# Patient Record
Sex: FEMALE | Race: NATIVE HAWAIIAN OR OTHER PACIFIC ISLANDER
[De-identification: names, ages, dates, MRNs, and addresses within clinical notes are randomized per-mention and may not be internally consistent; named-entity substitution may affect disease eponyms.]

---

## 2024-07-02 NOTE — THERAPY EVALUATION
[facility], [place] - [facility]  [address] [place] [address]  Phone: [phone]   [phone]  Plan of Care / Statement of Necessity for Physical Therapy Services     Patient Name: Bella Kenny : 1980   Medical   Diagnosis: Pain in right shoulder [M25.511]  Treatment Diagnosis:   Pain in right shoulder [M25.511]     Onset Date: 2024     Referral Source: Talib Story MD Start of Care (SOC): 7/3/2024   Prior Hospitalization: See medical history Provider #: [id number]   Prior Level of Function: No limitations prior to onset. works in real estate and legislator; lots of driving. Caregiver for 3 children.   Comorbidities: Social determinants of health: unremarkable, ADHD, anxiety, Lumbago, h/o CTS     Assessment / key information:  Pt is a 44 y.o. year old RHD female with subjective complaints of R shoulder pain.  LEONILA:  Pt reports insidious onset of R shoulder pain since Feb. Which she feels may be related to prolonged driving activity. Since that time the pain has become constant, \"dull\" pain that irritates especially with lifting/sleeping. Denies paresthesias. Pt has been seen by PCP and referred for xray which has not yet been completed as well as PT.    Current pain is rated as 1 to 7/10; and described as constant, \"dull\". Localized to R shoulder and radiates to elbow/wrist occasionally  Current functional limitations:  sleeping (unable to tolerate R s/l), reaching, lifting/carrying  Better with: R UE in ER and 90 deg flex by side.   QuickDASH score= 63.75% indicating severe impairment to functional activities.    Today's evaulation is significant for:  Fall Risk Assessment: Does the patient have a fear of falling? NO.   If yes, what fall risk assessment was performed?  N/A    POSTURE/OBSERVATION: FHP, flattened c/s lordosis, R>L scap abd and ant tip   Significant slumped seated posture throughout exam    ROM   CERVICAL: Flex WNL, Ext WNL.

## 2024-07-03 ENCOUNTER — HOSPITAL ENCOUNTER (OUTPATIENT)
Facility: HOSPITAL | Age: 44
Setting detail: RECURRING SERIES
Discharge: HOME | End: 2024-07-06
Payer: COMMERCIAL

## 2024-07-03 PROCEDURE — 97162 PT EVAL MOD COMPLEX 30 MIN: CPT

## 2024-07-03 PROCEDURE — 97110 THERAPEUTIC EXERCISES: CPT

## 2024-07-03 NOTE — PROGRESS NOTES
PHYSICAL / OCCUPATIONAL THERAPY - DAILY TREATMENT NOTE (updated )  For Eval visit    Patient Name: Bella Kenny    Date: 7/3/2024    : 1980  Insurance: Payor: DONNA / Plan: [facility] [place] / Product Type: *No Product type* /      Patient  verified yes     Visit #   Current / Total 1 16   Time   In / Out 8:20 9:05   Pain   In / Out 4 3   Subjective Functional Status/Changes: See POC     TREATMENT AREA =  see POC    OBJECTIVE       30 min   Eval - untimed                      Therapeutic Procedures:    Tx Min Billable or 1:1 Min (if diff from Tx Min) Procedure, Rationale, Specifics   5 5 87219 Self Care/Home Management (timed):  improve patient knowledge and understanding of activity modification, diagnosis/prognosis, and physical therapy expectations, procedures and progression  to improve patient's ability to progress to PLOF and address remaining functional goals.  (see flow sheet as applicable)     Details if applicable:    -advised ice x10 min PRN pain  -pt ed to avoid repetitive reaching and no lifting/carrying to promote tissue healing   10 10 43282 Therapeutic Exercise (timed):  increase ROM, strength, coordination, balance, and proprioception to improve patient's ability to progress to PLOF and address remaining functional goals. (see flow sheet as applicable)     Details if applicable:            Details if applicable:            Details if applicable:     15 15 [facility] Totals Reminder: bill using total billable min of TIMED therapeutic procedures (example: do not include dry needle or estim unattended, both untimed codes, in totals to left)  8-22 min = 1 unit; 23-37 min = 2 units; 38-52 min = 3 units; 53-67 min = 4 units; 68-82 min = 5 units   Total Total     [x]  Patient Education billed concurrently with other procedures   [x] Review HEP    [] Progressed/Changed HEP, detail:    [] Other detail:       Objective Information/Functional Measures/Assessment    See POC    Patient will

## 2024-07-10 ENCOUNTER — HOSPITAL ENCOUNTER (OUTPATIENT)
Facility: HOSPITAL | Age: 44
Setting detail: RECURRING SERIES
Discharge: HOME | End: 2024-07-13
Payer: COMMERCIAL

## 2024-07-10 PROCEDURE — 97110 THERAPEUTIC EXERCISES: CPT

## 2024-07-10 PROCEDURE — 97140 MANUAL THERAPY 1/> REGIONS: CPT

## 2024-07-10 PROCEDURE — 97535 SELF CARE MNGMENT TRAINING: CPT

## 2024-07-10 NOTE — PROGRESS NOTES
PHYSICAL / OCCUPATIONAL THERAPY - DAILY TREATMENT NOTE    Patient Name: Bella Kenny    Date: 7/10/2024    : 1980  Insurance: Payor: DONNA / [place]: [facility] [place] / Product Type: *No Product type* /      Patient  verified Yes     Visit #   Current / Total 2 16   Time   In / Out 1142 1247   Pain   In / Out 4 3   Subjective Functional Status/Changes: Pt reports pain in her shoulder today. Reports pain when driving for long distances and with overhead motions. States she has been doing her exercises at home.     TREATMENT AREA =  Pain in right shoulder [M25.511]     OBJECTIVE         Therapeutic Procedures:    Tx Min Billable or 1:1 Min (if diff from Tx Min) Procedure, Rationale, Specifics   30 30 65853 Therapeutic Exercise (timed):  increase ROM, strength, coordination, balance, and proprioception to improve patient's ability to progress to PLOF and address remaining functional goals. (see flow sheet as applicable)     Details if applicable:       15 15 21299 Manual Therapy (timed):  decrease pain, increase ROM, increase tissue extensibility, decrease trigger points, and increase postural awareness to improve patient's ability to progress to PLOF and address remaining functional goals.  The manual therapy interventions were performed at a separate and distinct time from the therapeutic activities interventions . (see flow sheet as applicable)     Details if applicable:  STM/DTM UT, pec minor, bicipital groove, teres minor, gentle PROM in all planes   10 10 84630 Self Care/Home Management (timed):  improve patient knowledge and understanding of pain reducing techniques, positioning, posture/ergonomics, activity modification, and physical therapy expectations, procedures and progression  to improve patient's ability to progress to PLOF and address remaining functional goals.  (see flow sheet as applicable)     Details if applicable:  Pt education regarding postural awareness utilizing spine model; HEP

## 2024-07-12 ENCOUNTER — HOSPITAL ENCOUNTER (OUTPATIENT)
Facility: HOSPITAL | Age: 44
Setting detail: RECURRING SERIES
End: 2024-07-12
Payer: COMMERCIAL

## 2024-07-16 NOTE — PROGRESS NOTES
if applicable:            Details if applicable:     57 57 [facility] Totals Reminder: bill using total billable min of TIMED therapeutic procedures (example: do not include dry needle or estim unattended, both untimed codes, in totals to left)  8-22 min = 1 unit; 23-37 min = 2 units; 38-52 min = 3 units; 53-67 min = 4 units; 68-82 min = 5 units   Total Total     [x]  Patient Education billed concurrently with other procedures   [x] Review HEP    [] Progressed/Changed HEP, detail:    [] Other detail:       Objective Information/Functional Measures/Assessment    - Increased resistance for band rows/ext  - Added wall slides  - Added open books    Progressed therex as per flowsheet to increase R shoulder strength and mobility. Pt able to complete exercises with mod challenge. Pt required VC to prevent upper trap compensation during exercises. Noted improved form after cueing. Decreased TrP noted along R UT and bicipital groove from previous visit. Noted increased R shoulder pain post manual therapy. Updated and reviewed HEP with pt. Pt confirmed understanding by performing demo. Plan to added IR stretch NV to address LTG 2.    Patient will continue to benefit from skilled PT / OT services to modify and progress therapeutic interventions, analyze and address functional mobility deficits, analyze and address ROM deficits, analyze and address strength deficits, and analyze and address soft tissue restrictions to address functional deficits and attain remaining goals.    Progress toward goals / Updated goals:  []  See Progress Note/Recertification    Short Term Goals: To be accomplished in 2 WEEKS  1.  Pt will be educated in/compliant with appropriate HEP to decrease pain, increase ROM, increase strength and return pt to PLOF.    Status at last note/certification: issued at [facility]  Current:   7/10/24: Progressing, reports initial compliance  2. Pt will increase R shoulder seated ABD ROM by >/= 30 deg in order to improve tolerance

## 2024-07-17 ENCOUNTER — HOSPITAL ENCOUNTER (OUTPATIENT)
Facility: HOSPITAL | Age: 44
Setting detail: RECURRING SERIES
Discharge: HOME | End: 2024-07-20
Payer: COMMERCIAL

## 2024-07-17 PROCEDURE — 97140 MANUAL THERAPY 1/> REGIONS: CPT

## 2024-07-17 PROCEDURE — 97535 SELF CARE MNGMENT TRAINING: CPT

## 2024-07-17 PROCEDURE — 97110 THERAPEUTIC EXERCISES: CPT

## 2024-07-18 NOTE — PROGRESS NOTES
PHYSICAL / OCCUPATIONAL THERAPY - DAILY TREATMENT NOTE    Patient Name: Bella Kenny    Date: 2024    : 1980  Insurance: Payor: DONNA / [place]: [facility] [place] / Product Type: *No Product type* /      Patient  verified Yes     Visit #   Current / Total 4 16   Time   In / Out 1220 107   Pain   In / Out 3-4 3   Subjective Functional Status/Changes: Pt reports increased pain and soreness in her shoulder and neck. States she had to drive to [place] and back yesterday.     TREATMENT AREA =  Pain in right shoulder [M25.511]     OBJECTIVE         Therapeutic Procedures:    Tx Min Billable or 1:1 Min (if diff from Tx Min) Procedure, Rationale, Specifics   29 29 99703 Therapeutic Exercise (timed):  increase ROM, strength, coordination, balance, and proprioception to improve patient's ability to progress to PLOF and address remaining functional goals. (see flow sheet as applicable)     Details if applicable:       10 10 84283 Manual Therapy (timed):  decrease pain, increase ROM, increase tissue extensibility, decrease trigger points, and increase postural awareness to improve patient's ability to progress to PLOF and address remaining functional goals.  The manual therapy interventions were performed at a separate and distinct time from the therapeutic activities interventions . (see flow sheet as applicable)     Details if applicable:  STM/DTM UT, pec minor, bicipital groove, teres minor   8 8 69784 Self Care/Home Management (timed):  improve patient knowledge and understanding of pain reducing techniques, positioning, posture/ergonomics, activity modification, and physical therapy expectations, procedures and progression  to improve patient's ability to progress to PLOF and address remaining functional goals.  (see flow sheet as applicable)     Details if applicable:  Pt education regarding postural awareness; HEP review and demo; pt edu on theracane use          Details if applicable:

## 2024-07-19 ENCOUNTER — HOSPITAL ENCOUNTER (OUTPATIENT)
Facility: HOSPITAL | Age: 44
Setting detail: RECURRING SERIES
Discharge: HOME | End: 2024-07-22
Payer: COMMERCIAL

## 2024-07-19 PROCEDURE — 97140 MANUAL THERAPY 1/> REGIONS: CPT

## 2024-07-19 PROCEDURE — 97535 SELF CARE MNGMENT TRAINING: CPT

## 2024-07-19 PROCEDURE — 97110 THERAPEUTIC EXERCISES: CPT

## 2024-07-24 ENCOUNTER — HOSPITAL ENCOUNTER (OUTPATIENT)
Facility: HOSPITAL | Age: 44
Setting detail: RECURRING SERIES
Discharge: HOME | End: 2024-07-27
Payer: COMMERCIAL

## 2024-07-24 PROCEDURE — 97530 THERAPEUTIC ACTIVITIES: CPT

## 2024-07-24 PROCEDURE — 97140 MANUAL THERAPY 1/> REGIONS: CPT

## 2024-07-24 PROCEDURE — 97110 THERAPEUTIC EXERCISES: CPT

## 2024-07-24 NOTE — PROGRESS NOTES
= 5 units   Total Total     [x]  Patient Education billed concurrently with other procedures   [x] Review HEP    [] Progressed/Changed HEP, detail:    [] Other detail:       Objective Information/Functional Measures/Assessment    Pt presents with right shoulder pain especially with raising her arm overhead, decreased shoulder ROM, shortened pecs and decreased UE strength. Administered DTM pt pt's rhomboids and UT to improve tissue mobility and reduce her shoulder pain. Instructed pt in exercises to stretch her pectoralis and UT muscles to improve her mobility and reduce her shoulder pain. Instructed pt in exercises to strengthen her parascapular muscles to improve the upwards rotation of her scapulae and improve the quality of her OH shoulder movement. Provided verbal cues and demonstration to correct her from with exercises.     Patient will continue to benefit from skilled PT / OT services to modify and progress therapeutic interventions, analyze and address functional mobility deficits, analyze and address ROM deficits, analyze and address strength deficits, analyze and address soft tissue restrictions, and analyze and cue for proper movement patterns to address functional deficits and attain remaining goals.    Progress toward goals / Updated goals:  []  See Progress Note/Recertification    Short Term Goals: To be accomplished in 2 WEEKS  1.  Pt will be educated in/compliant with appropriate HEP to decrease pain, increase ROM, increase strength and return pt to PLOF.    Status at last note/certification: issued at [facility]  Current:   7/10/24: Progressing, reports initial compliance  2. Pt will increase R shoulder seated ABD ROM by >/= 30 deg in order to improve tolerance with reaching activities.  Status at last note/certification:  ABD R 118 (pain >70)  Current:        Long Term Goals: To be accomplished in 8 WEEKS  1. Pt will improve QuickDASH score to </= 44 to demo a significant improvement in functional

## 2024-07-29 ENCOUNTER — HOSPITAL ENCOUNTER (OUTPATIENT)
Facility: HOSPITAL | Age: 44
Setting detail: RECURRING SERIES
Discharge: HOME | End: 2024-08-01
Payer: COMMERCIAL

## 2024-07-29 PROCEDURE — 97110 THERAPEUTIC EXERCISES: CPT

## 2024-07-29 PROCEDURE — 97112 NEUROMUSCULAR REEDUCATION: CPT

## 2024-07-29 PROCEDURE — 97140 MANUAL THERAPY 1/> REGIONS: CPT

## 2024-07-29 PROCEDURE — 97530 THERAPEUTIC ACTIVITIES: CPT

## 2024-07-29 NOTE — PROGRESS NOTES
PHYSICAL / OCCUPATIONAL THERAPY - DAILY TREATMENT NOTE    Patient Name: Bella Kenny    Date: 2024    : 1980  Insurance: Payor: DONNA / [place]: [facility] [place] / Product Type: *No Product type* /      Patient  verified Yes     Visit #   Current / Total 6 16   Time   In / Out 304 400   Pain   In / Out 3 3   Subjective Functional Status/Changes: Pt reports no change in pain since last visit.      TREATMENT AREA =  Pain in right shoulder [M25.511]     OBJECTIVE         Therapeutic Procedures:    Tx Min Billable or 1:1 Min (if diff from Tx Min) Procedure, Rationale, Specifics   20  60811 Therapeutic Exercise (timed):  increase ROM, strength, coordination, balance, and proprioception to improve patient's ability to progress to PLOF and address remaining functional goals. (see flow sheet as applicable)     Details if applicable:       10 10 23308 Therapeutic Activity (timed):  use of dynamic activities replicating functional movements to increase ROM, strength, coordination, balance, and proprioception in order to improve patient's ability to progress to PLOF and address remaining functional goals.  (see flow sheet as applicable)     Details if applicable:      19599 Neuromuscular Re-Education (timed):  improve balance, coordination, kinesthetic sense, posture, core stability and proprioception to improve patient's ability to develop conscious control of individual muscles and awareness of position of extremities in order to progress to PLOF and address remaining functional goals. (see flow sheet as applicable)     Details if applicable:     15 15 01688 Manual Therapy (timed):  decrease pain, increase ROM, increase tissue extensibility, and decrease trigger points to improve patient's ability to progress to PLOF and address remaining functional goals.  The manual therapy interventions were performed at a separate and distinct time from the therapeutic activities interventions . (see flow sheet

## 2024-07-31 ENCOUNTER — APPOINTMENT (OUTPATIENT)
Facility: HOSPITAL | Age: 44
End: 2024-07-31
Payer: COMMERCIAL

## 2024-08-01 NOTE — THERAPY RECERTIFICATION
[facility]  [address] [place] [address]  Phone: [phone]   [phone]  PHYSICAL THERAPY PROGRESS NOTE  Patient Name: Bella Kenny : 1980   Treatment/Medical Diagnosis: Pain in right shoulder [M25.511]   Referral Source: Talib Story MD     Date of Initial Visit: 7/3/24 Attended Visits: 7 Missed Visits: 1     SUMMARY OF TREATMENT  Physical therapy has consisted of therapeutic exercises for increased cervico-thoracic/shoulder strength, ROM, and flexibility. Therapeutic activities performed to improve functional activities, model real life movements and performance specific to the patient's need with supervision to return the patient to their PLOF.  Neuromuscular Re-ed performed to improve coordination, improve mm activation, improve proprioception to improve the patient's ability to perform ADL/IADL's.  Manual therapy PRN for decreased muscle hypertonicity and increased ROM/flexibility. Pt education provided regarding HEP.     CURRENT STATUS  Pt has been seen for 7 sessions of skilled PT for dx: R shoulder pain.  Pt reports she \"things have really loosened up and it's feeling better\". Pt states R shoulder feels looser but now her left one feels a bit tight. Pt demonstrates variable improvement in R shoulder ROM/strength from SOC as per objective measures below. Pt has additional c/o R elbow pain consistent with lateral epicondylalgia on testing. R shoulder continues to demonstrate (+) signs for functional subacromial impingement and progression of scapular mechanics/strengthening will continue to meet her LTG's.    % improvement: pt unable to rate; states she is feeling better  Max pain 4/10; localized to R shoulder  Avg pain 2-3/10  Min pain 2/10    Current improvements:  Improved tolerance for R sidelying, slight improvement for lifting/carrying (I.e. for her child)  Remaining functional limitations:  sleeping

## 2024-08-01 NOTE — PROGRESS NOTES
PHYSICAL / OCCUPATIONAL THERAPY - DAILY TREATMENT NOTE    Patient Name: Bella Kenny    Date: 2024    : 1980  Insurance: Payor: DONNA / [place]: [facility] / Product Type: *No Product type* /      Patient  verified Yes     Visit #   Current / Total 7 16   Time   In / Out 11:00 11:40   Pain   In / Out 3 3   Subjective Functional Status/Changes: See PN     TREATMENT AREA =  Pain in right shoulder [M25.511]     OBJECTIVE        Therapeutic Procedures:    Tx Min Billable or 1:1 Min (if diff from Tx Min) Procedure, Rationale, Specifics   15 15 91209 Therapeutic Exercise (timed):  increase ROM, strength, coordination, balance, and proprioception to improve patient's ability to progress to PLOF and address remaining functional goals. (see flow sheet as applicable)     Details if applicable:        80868 Therapeutic Activity (timed):  use of dynamic activities replicating functional movements to increase ROM, strength, coordination, balance, and proprioception in order to improve patient's ability to progress to PLOF and address remaining functional goals.  (see flow sheet as applicable)     Details if applicable:     x  11966 Neuromuscular Re-Education (timed):  improve balance, coordination, kinesthetic sense, posture, core stability and proprioception to improve patient's ability to develop conscious control of individual muscles and awareness of position of extremities in order to progress to PLOF and address remaining functional goals. (see flow sheet as applicable)     Details if applicable:     TC  71181 Manual Therapy (timed):  decrease pain, increase ROM, increase tissue extensibility, and decrease trigger points to improve patient's ability to progress to PLOF and address remaining functional goals.  The manual therapy interventions were performed at a separate and distinct time from the therapeutic activities interventions . (see flow sheet as applicable)     Details if applicable:

## 2024-08-02 ENCOUNTER — HOSPITAL ENCOUNTER (OUTPATIENT)
Facility: HOSPITAL | Age: 44
Setting detail: RECURRING SERIES
Discharge: HOME | End: 2024-08-05
Payer: COMMERCIAL

## 2024-08-02 PROCEDURE — 97530 THERAPEUTIC ACTIVITIES: CPT

## 2024-08-02 PROCEDURE — 97110 THERAPEUTIC EXERCISES: CPT

## 2024-08-06 NOTE — PROGRESS NOTES
PHYSICAL / OCCUPATIONAL THERAPY - DAILY TREATMENT NOTE    Patient Name: Bella Kenny    Date: 2024    : 1980  Insurance: Payor: DONNA / [place]: [facility] [place] / Product Type: *No Product type* /      Patient  verified Yes     Visit #   Current / Total 8 15   Time   In / Out 107 148   Pain   In / Out 3 3   Subjective Functional Status/Changes: Pt reports difficulty sleeping last night 2/2 R shoulder pain. Continues to report an \"ache\" in her shoulder.     TREATMENT AREA =  Pain in right shoulder [M25.511]     OBJECTIVE         Therapeutic Procedures:    Tx Min Billable or 1:1 Min (if diff from Tx Min) Procedure, Rationale, Specifics   18 18 67817 Therapeutic Exercise (timed):  increase ROM, strength, coordination, balance, and proprioception to improve patient's ability to progress to PLOF and address remaining functional goals. (see flow sheet as applicable)     Details if applicable:       8 8 44604 Therapeutic Activity (timed):  use of dynamic activities replicating functional movements to increase ROM, strength, coordination, balance, and proprioception in order to improve patient's ability to progress to PLOF and address remaining functional goals.  (see flow sheet as applicable)     Details if applicable:     15 15 00174 Neuromuscular Re-Education (timed):  improve balance, coordination, kinesthetic sense, posture, core stability and proprioception to improve patient's ability to develop conscious control of individual muscles and awareness of position of extremities in order to progress to PLOF and address remaining functional goals. (see flow sheet as applicable)     Details if applicable:            Details if applicable:            Details if applicable:     41 41 [facility] Totals Reminder: bill using total billable min of TIMED therapeutic procedures (example: do not include dry needle or estim unattended, both untimed codes, in totals to left)  8-22 min = 1 unit; 23-37 min = 2 units; 38-52

## 2024-08-07 ENCOUNTER — HOSPITAL ENCOUNTER (OUTPATIENT)
Facility: HOSPITAL | Age: 44
Setting detail: RECURRING SERIES
Discharge: HOME | End: 2024-08-10
Payer: COMMERCIAL

## 2024-08-07 PROCEDURE — 97112 NEUROMUSCULAR REEDUCATION: CPT

## 2024-08-07 PROCEDURE — 97530 THERAPEUTIC ACTIVITIES: CPT

## 2024-08-07 PROCEDURE — 97110 THERAPEUTIC EXERCISES: CPT

## 2024-08-09 ENCOUNTER — HOSPITAL ENCOUNTER (OUTPATIENT)
Facility: HOSPITAL | Age: 44
Setting detail: RECURRING SERIES
Discharge: HOME | End: 2024-08-12
Payer: COMMERCIAL

## 2024-08-09 PROCEDURE — 97110 THERAPEUTIC EXERCISES: CPT

## 2024-08-09 PROCEDURE — 97112 NEUROMUSCULAR REEDUCATION: CPT

## 2024-08-09 PROCEDURE — 97530 THERAPEUTIC ACTIVITIES: CPT

## 2024-08-09 NOTE — PROGRESS NOTES
170 for improved ease with reaching  Status at last note/certification:   8/2: Flex 155 (post shoulder p!).  (UT compensation >90 deg and p!)   Current:       Next PN/ RC due 9/2/24  Auth due (ARIANNA, 12/31/24)    PLAN  - Continue Plan of Care    Mehrdad Bailey PTA    8/9/2024    12:50 PM  If an interpreting service was utilized for treatment of this patient, the contents of this document represent the material reviewed with the patient via the .     Future Appointments   Date Time Provider Department Center   8/9/2024  3:00 PM Mehrdad Bailey PTA [facility]   8/12/2024 12:20 PM Mehrdad Bailey PTA [facility]   8/16/2024 11:40 AM Mehrdad Bailey PTA [facility]   8/20/2024  9:00 AM Mary Cervantes PT [facility]   8/23/2024  8:20 AM Mehrdad Bailey PTA [facility]

## 2024-08-12 ENCOUNTER — HOSPITAL ENCOUNTER (OUTPATIENT)
Facility: HOSPITAL | Age: 44
Setting detail: RECURRING SERIES
Discharge: HOME | End: 2024-08-15
Payer: COMMERCIAL

## 2024-08-12 PROCEDURE — 97112 NEUROMUSCULAR REEDUCATION: CPT

## 2024-08-12 PROCEDURE — 97535 SELF CARE MNGMENT TRAINING: CPT

## 2024-08-12 PROCEDURE — 97110 THERAPEUTIC EXERCISES: CPT

## 2024-08-12 NOTE — PROGRESS NOTES
PHYSICAL / OCCUPATIONAL THERAPY - DAILY TREATMENT NOTE    Patient Name: Bella Kenny    Date: 2024    : 1980  Insurance: Payor: DONNA / [place]: [facility] [place] / Product Type: *No Product type* /      Patient  verified Yes     Visit #   Current / Total 10 15   Time   In / Out 1220 111   Pain   In / Out 2 0   Subjective Functional Status/Changes: Pt states shoulder feels better today and reports decreased pain. States she can feel her strength improving in her shoulder blades.     TREATMENT AREA =  Pain in right shoulder [M25.511]     OBJECTIVE         Therapeutic Procedures:    Tx Min Billable or 1:1 Min (if diff from Tx Min) Procedure, Rationale, Specifics   28 28 53796 Therapeutic Exercise (timed):  increase ROM, strength, coordination, balance, and proprioception to improve patient's ability to progress to PLOF and address remaining functional goals. (see flow sheet as applicable)     Details if applicable:       x x 42125 Therapeutic Activity (timed):  use of dynamic activities replicating functional movements to increase ROM, strength, coordination, balance, and proprioception in order to improve patient's ability to progress to PLOF and address remaining functional goals.  (see flow sheet as applicable)     Details if applicable:     15 15 47045 Neuromuscular Re-Education (timed):  improve balance, coordination, kinesthetic sense, posture, core stability and proprioception to improve patient's ability to develop conscious control of individual muscles and awareness of position of extremities in order to progress to PLOF and address remaining functional goals. (see flow sheet as applicable)     Details if applicable:     8 8 43923 Self Care/Home Management (timed):  improve patient knowledge and understanding of pain reducing techniques, positioning, and physical therapy expectations, procedures and progression  to improve patient's ability to progress to PLOF and address remaining

## 2024-08-16 ENCOUNTER — HOSPITAL ENCOUNTER (OUTPATIENT)
Facility: HOSPITAL | Age: 44
Setting detail: RECURRING SERIES
Discharge: HOME | End: 2024-08-19
Payer: COMMERCIAL

## 2024-08-16 PROCEDURE — 97530 THERAPEUTIC ACTIVITIES: CPT

## 2024-08-16 PROCEDURE — 97535 SELF CARE MNGMENT TRAINING: CPT

## 2024-08-16 NOTE — THERAPY DISCHARGE
PT DISCHARGE DAILY NOTE AND SUMMARY     Date:2024  Patient name: Bella Kenny Start of Care: 7/3/2024   Referral source: Talib Story MD : [id number]   Medical/Treatment Diagnosis: Pain in right shoulder [M25.511] Onset Date:2024     Prior Hospitalization: see medical history Provider#: [id number]   Comorbidities: Social determinants of health: unremarkable, ADHD, anxiety, Lumbago, h/o CTS   Prior Level of Function:No limitations prior to onset. 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 Caregiver for 3 children.   Insurance: Payor: [employer] / Plan: [facility] / Product Type: *No Product type* /      Visits from Start of Care: 11  Missed Visits: 1    non-Medicare    Patient  verified yes     Visit #   Current / Total 11 15   Time   In / Out 1146 1226   Pain   In / Out 2/10 2/10   Subjective Functional Status/Changes: Pt states she would like to make today her last day of therapy as she will be having breast reduction surgery soon.      TREATMENT AREA =  Pain in right shoulder [M25.511]    If an interpreting service is utilized for treatment of this patient, the contents of this document represent the material reviewed with the patient via the .     OBJECTIVE         Therapeutic Procedures:    Tx Min Billable or 1:1 Min (if diff from Tx Min) Procedure, Rationale, Specifics   [phone] [id number] Therapeutic Activity (timed):  use of dynamic activities replicating functional movements to increase ROM, strength, coordination, balance, and proprioception in order to improve patient's ability to progress to PLOF and address remaining functional goals.  (see flow sheet as applicable)     Details if applicable:       x x [id number] Therapeutic Exercise (timed):  increase ROM, strength, coordination, balance, and proprioception to improve patient's ability to progress to PLOF and address remaining functional goals. (see flow sheet as applicable)     Details if applicable:     [phone]

## 2024-08-20 ENCOUNTER — APPOINTMENT (OUTPATIENT)
Facility: HOSPITAL | Age: 44
End: 2024-08-20
Payer: COMMERCIAL

## 2024-08-23 ENCOUNTER — APPOINTMENT (OUTPATIENT)
Facility: HOSPITAL | Age: 44
End: 2024-08-23
Payer: COMMERCIAL